# Patient Record
Sex: MALE | Race: OTHER | Employment: FULL TIME | URBAN - NONMETROPOLITAN AREA
[De-identification: names, ages, dates, MRNs, and addresses within clinical notes are randomized per-mention and may not be internally consistent; named-entity substitution may affect disease eponyms.]

---

## 2017-12-22 ENCOUNTER — HOSPITAL ENCOUNTER (EMERGENCY)
Age: 42
Discharge: HOME | End: 2017-12-22
Payer: MEDICAID

## 2017-12-22 ENCOUNTER — HOSPITAL ENCOUNTER (EMERGENCY)
Dept: GENERAL RADIOLOGY | Age: 42
Discharge: HOME | End: 2017-12-22
Payer: MEDICAID

## 2017-12-22 VITALS
TEMPERATURE: 98.9 F | SYSTOLIC BLOOD PRESSURE: 138 MMHG | RESPIRATION RATE: 16 BRPM | OXYGEN SATURATION: 98 % | DIASTOLIC BLOOD PRESSURE: 63 MMHG | WEIGHT: 246 LBS | HEART RATE: 74 BPM

## 2017-12-22 DIAGNOSIS — Z76.89 ENCOUNTER FOR INCISION AND DRAINAGE PROCEDURE: ICD-10-CM

## 2017-12-22 DIAGNOSIS — M70.22 OLECRANON BURSITIS OF LEFT ELBOW: Primary | ICD-10-CM

## 2017-12-22 DIAGNOSIS — S52.022A CLOSED FRACTURE OF LEFT OLECRANON PROCESS, INITIAL ENCOUNTER: ICD-10-CM

## 2017-12-22 PROCEDURE — 20605 DRAIN/INJ JOINT/BURSA W/O US: CPT | Performed by: NURSE PRACTITIONER

## 2017-12-22 PROCEDURE — 87205 SMEAR GRAM STAIN: CPT

## 2017-12-22 PROCEDURE — 10060 I&D ABSCESS SIMPLE/SINGLE: CPT

## 2017-12-22 PROCEDURE — A6446 CONFORM BAND S W>=3" <5"/YD: HCPCS

## 2017-12-22 PROCEDURE — 99203 OFFICE O/P NEW LOW 30 MIN: CPT | Performed by: NURSE PRACTITIONER

## 2017-12-22 PROCEDURE — 89051 BODY FLUID CELL COUNT: CPT

## 2017-12-22 PROCEDURE — 89060 EXAM SYNOVIAL FLUID CRYSTALS: CPT

## 2017-12-22 PROCEDURE — 73080 X-RAY EXAM OF ELBOW: CPT

## 2017-12-22 PROCEDURE — 87075 CULTR BACTERIA EXCEPT BLOOD: CPT

## 2017-12-22 PROCEDURE — 87070 CULTURE OTHR SPECIMN AEROBIC: CPT

## 2017-12-22 PROCEDURE — 99204 OFFICE O/P NEW MOD 45 MIN: CPT

## 2017-12-22 RX ORDER — DICLOFENAC POTASSIUM 25 MG/1
1 CAPSULE, LIQUID FILLED ORAL 3 TIMES DAILY
Qty: 21 CAPSULE | Refills: 0 | Status: SHIPPED | OUTPATIENT
Start: 2017-12-22

## 2017-12-22 RX ORDER — CLARITHROMYCIN 500 MG/1
500 TABLET, COATED ORAL 2 TIMES DAILY
Qty: 20 TABLET | Refills: 0 | Status: SHIPPED | OUTPATIENT
Start: 2017-12-22 | End: 2018-01-01

## 2017-12-22 RX ORDER — IBUPROFEN 600 MG/1
600 TABLET ORAL EVERY 6 HOURS PRN
COMMUNITY
End: 2017-12-22 | Stop reason: CLARIF

## 2017-12-22 ASSESSMENT — ENCOUNTER SYMPTOMS
TROUBLE SWALLOWING: 0
CHEST TIGHTNESS: 0
EYE ITCHING: 0
EYE PAIN: 0
COUGH: 0
WHEEZING: 0
EYE DISCHARGE: 0
SINUS PRESSURE: 0
BACK PAIN: 0
SHORTNESS OF BREATH: 0
SORE THROAT: 0
VOMITING: 0
NAUSEA: 0
DIARRHEA: 0
CONSTIPATION: 0
EYE REDNESS: 0
ABDOMINAL PAIN: 0
RHINORRHEA: 0

## 2017-12-22 ASSESSMENT — PAIN DESCRIPTION - ORIENTATION: ORIENTATION: RIGHT

## 2017-12-22 ASSESSMENT — PAIN SCALES - GENERAL: PAINLEVEL_OUTOF10: 5

## 2017-12-22 ASSESSMENT — PAIN DESCRIPTION - PAIN TYPE: TYPE: ACUTE PAIN

## 2017-12-22 ASSESSMENT — PAIN DESCRIPTION - DESCRIPTORS: DESCRIPTORS: SORE

## 2017-12-22 ASSESSMENT — PAIN DESCRIPTION - LOCATION: LOCATION: ELBOW

## 2017-12-22 NOTE — ED TRIAGE NOTES
ambulatory back to exam room. Patient complains of right elbow swelling and pain. Symptoms began 2 weeks ago. Respirations easy and unlabored. Condition stable. Waiting in room to be seen by medical provider.

## 2017-12-22 NOTE — ED PROVIDER NOTES
headaches. Hematological: Negative. PAST MEDICAL HISTORY   History reviewed. No pertinent past medical history. SURGICAL HISTORY     Patient  has a past surgical history that includes back surgery. CURRENT MEDICATIONS       Discharge Medication List as of 12/22/2017  3:06 PM      CONTINUE these medications which have NOT CHANGED    Details   ibuprofen (ADVIL;MOTRIN) 600 MG tablet Take 600 mg by mouth every 6 hours as needed for PainHistorical Med             ALLERGIES     Patient is has No Known Allergies. FAMILY HISTORY     Patient's family history is not on file. SOCIAL HISTORY     Patient  reports that he has been smoking Cigarettes. He has been smoking about 0.25 packs per day. He has never used smokeless tobacco. He reports that he drinks alcohol. He reports that he does not use drugs. PHYSICAL EXAM     ED TRIAGE VITALS  BP: 138/63, Temp: 98.9 °F (37.2 °C), Pulse: 74, Resp: 16, SpO2: 98 %  Physical Exam   Constitutional: He is oriented to person, place, and time. He appears well-developed and well-nourished. No distress. Musculoskeletal:        Right elbow: He exhibits decreased range of motion (swelling and effusion over the olecranon process with point tenderness. Decreased range motion of flexion and extension and supination pronation.), swelling and effusion. Tenderness found. Olecranon process tenderness noted. Neurological: He is alert and oriented to person, place, and time. Skin: Skin is warm and dry. DIAGNOSTIC RESULTS   Labs:  Results for orders placed or performed during the hospital encounter of 12/22/17   Anaerobic and Aerobic Culture   Result Value Ref Range    Aerobic Culture No growth-preliminary     Gram Stain Result       Moderate segmented neutrophils observed. No organisms observed. performed on cytospun specimen     Synovial fluid, cell count   Result Value Ref Range    Color, Fluid YELLOW     CHARACTER,SYN. FL HAZY     Crystals, Fluid see below NONE SEEN    WBC, Fluid 1,492.0 0-150/mm3 mm3    SEGS, SYNOVIAL FLUID 27 (H) 0 - 25 %    LYMPHOCYTE, SYNOVIAL FLUID 51 25 - 95 %    Monocyte Count, Fluid 22 (L) 25 - 95 %       IMAGING:  XR ELBOW RIGHT (MIN 3 VIEWS)   Final Result   1. Fragmented osteophytes posterior to the olecranon of indeterminate age. Cannot exclude an acutely fractured osteophyte. No other acute fracture is otherwise seen. 2. No definite effusion is demonstrated. 3. There is soft tissue swelling posterior to the olecranon which may represent olecranon bursitis. **This report has been created using voice recognition software. It may contain minor errors which are inherent in voice recognition technology. **      Final report electronically signed by Dr. Sharyle Compton on 12/22/2017 2:12 PM        URGENT CARE COURSE:   Incision/Drainage  Date/Time: 12/22/2017 3:07 PM  Performed by: Rosa Marcus  Authorized by: Rosa Marcus     Consent:     Consent obtained:  Verbal    Consent given by:  Patient    Risks discussed:  Incomplete drainage  Location:     Indications for incision and drainage: olecranon bursa. Size:  4 cm diameter    Location:  Upper extremity    Upper extremity location:  Elbow    Elbow location:  L elbow  Pre-procedure details:     Skin preparation:  Betadine  Anesthesia (see MAR for exact dosages): Anesthesia method:  Local infiltration    Local anesthetic:  Lidocaine 1% w/o epi  Procedure type:     Complexity:  Simple  Procedure details:     Needle aspiration: yes      Needle size:  18 G (10 cc dark yellow fluid sent to lab for analysis)    Packing materials:  None  Post-procedure details:     Patient tolerance of procedure: Tolerated well, no immediate complications        Vitals:    12/22/17 1329   BP: 138/63   Pulse: 74   Resp: 16   Temp: 98.9 °F (37.2 °C)   TempSrc: Oral   SpO2: 98%   Weight: 246 lb (111.6 kg)       Medications - No data to display  PROCEDURES:  None  FINAL IMPRESSION      1.  Olecranon

## 2017-12-22 NOTE — ED NOTES
Discharge instructions reviewed with patient. Patient informed to go to ER for worsening symptoms and to follow up at [facility]. Appt scheduled for Dec 26 at 12:10. Patient ambulatory out in stable condition.       Merideth Boas, RN  12/22/17 0816

## 2017-12-23 LAB
CHARACTER,SYN.FL: ABNORMAL
COLOR FLUID: YELLOW
CRYSTALS, FLUID: ABNORMAL
LYMPHOCYTE, SYNOVIAL FLUID: 51 % (ref 25–95)
MONOCYTE, FLUID: 22 % (ref 25–95)
SEGS, SYNOVIAL FLUID: 27 % (ref 0–25)
WBC FLUID: 1492 MM3

## 2017-12-26 ASSESSMENT — ENCOUNTER SYMPTOMS
WHEEZING: 0
BACK PAIN: 0
EYE REDNESS: 0
SORE THROAT: 0
NAUSEA: 0
ABDOMINAL PAIN: 0
EYE ITCHING: 0
VOMITING: 0
COUGH: 0
CHEST TIGHTNESS: 0
EYE PAIN: 0
RHINORRHEA: 0
SHORTNESS OF BREATH: 0
DIARRHEA: 0
CONSTIPATION: 0
EYE DISCHARGE: 0
TROUBLE SWALLOWING: 0
SINUS PRESSURE: 0

## 2017-12-27 LAB
AEROBIC CULTURE: NORMAL
ANAEROBIC CULTURE: NORMAL
GRAM STAIN RESULT: NORMAL